# Patient Record
Sex: MALE | Race: WHITE | NOT HISPANIC OR LATINO | URBAN - METROPOLITAN AREA
[De-identification: names, ages, dates, MRNs, and addresses within clinical notes are randomized per-mention and may not be internally consistent; named-entity substitution may affect disease eponyms.]

---

## 2021-07-19 ENCOUNTER — OUTPATIENT (OUTPATIENT)
Dept: OUTPATIENT SERVICES | Facility: HOSPITAL | Age: 31
LOS: 1 days | End: 2021-07-19
Payer: COMMERCIAL

## 2021-07-19 DIAGNOSIS — Z11.52 ENCOUNTER FOR SCREENING FOR COVID-19: ICD-10-CM

## 2021-07-19 LAB — SARS-COV-2 RNA SPEC QL NAA+PROBE: SIGNIFICANT CHANGE UP

## 2021-07-19 PROCEDURE — U0003: CPT

## 2021-07-19 PROCEDURE — U0005: CPT

## 2021-07-19 PROCEDURE — C9803: CPT

## 2022-04-02 ENCOUNTER — EMERGENCY (EMERGENCY)
Facility: HOSPITAL | Age: 32
LOS: 1 days | Discharge: HOME | End: 2022-04-02
Attending: EMERGENCY MEDICINE
Payer: COMMERCIAL

## 2022-04-02 VITALS
RESPIRATION RATE: 20 BRPM | HEIGHT: 71 IN | HEART RATE: 105 BPM | SYSTOLIC BLOOD PRESSURE: 121 MMHG | WEIGHT: 225.09 LBS | DIASTOLIC BLOOD PRESSURE: 76 MMHG | OXYGEN SATURATION: 97 % | TEMPERATURE: 98 F

## 2022-04-02 VITALS
DIASTOLIC BLOOD PRESSURE: 65 MMHG | HEART RATE: 94 BPM | OXYGEN SATURATION: 99 % | RESPIRATION RATE: 18 BRPM | SYSTOLIC BLOOD PRESSURE: 119 MMHG

## 2022-04-02 LAB — T PALLIDUM AB TITR SER: NEGATIVE — SIGNIFICANT CHANGE UP

## 2022-04-02 PROCEDURE — 36415 COLL VENOUS BLD VENIPUNCTURE: CPT

## 2022-04-02 PROCEDURE — 99283 EMERGENCY DEPT VISIT LOW MDM: CPT

## 2022-04-02 PROCEDURE — 86780 TREPONEMA PALLIDUM: CPT

## 2022-04-02 PROCEDURE — 99284 EMERGENCY DEPT VISIT MOD MDM: CPT

## 2022-04-02 RX ORDER — FAMOTIDINE 10 MG/ML
20 INJECTION INTRAVENOUS ONCE
Refills: 0 | Status: COMPLETED | OUTPATIENT
Start: 2022-04-02 | End: 2022-04-02

## 2022-04-02 RX ORDER — FAMOTIDINE 10 MG/ML
1 INJECTION INTRAVENOUS
Qty: 20 | Refills: 0
Start: 2022-04-02 | End: 2022-04-11

## 2022-04-02 RX ADMIN — Medication 50 MILLIGRAM(S): at 11:06

## 2022-04-02 RX ADMIN — FAMOTIDINE 20 MILLIGRAM(S): 10 INJECTION INTRAVENOUS at 11:06

## 2022-04-02 NOTE — ED ADULT NURSE NOTE - DISCHARGE DATE/TIME
Surgery Date: 9/27/19  Location: [facility]  Surgery Type: FR HAMMERTOE CORRECTION FOURTH DIGIT L FOOT  Surgeon: Dr. Slaughter    Fasting CBC BMP UA and EKG ordered per surgeon, any further testing?   02-Apr-2022 11:36

## 2022-04-02 NOTE — ED PROVIDER NOTE - NS ED ATTENDING STATEMENT MOD
This was a shared visit with the FATOUMATA. I reviewed and verified the documentation and independently performed the documented:

## 2022-04-02 NOTE — ED PROVIDER NOTE - CLINICAL SUMMARY MEDICAL DECISION MAKING FREE TEXT BOX
Rash.  initially may have been zoster.  but now with diffuse rash that is not vesicular but rather urticarial.  likely drug rash or allergic in nature.  will discontinue bactrim and start steroids.  follow up with allergy and immunology.

## 2022-04-02 NOTE — ED PROVIDER NOTE - PHYSICAL EXAMINATION
skin: right gluteal cleft vesicular rash and vesicles to penis  skin: macular papular rash urticarial

## 2022-04-02 NOTE — ED ADULT NURSE NOTE - OBJECTIVE STATEMENT
32 year old male presented to ED from home with c/o of rash throughout entire body starting 5 days ago, went to Urgent Care, was told it was a heat rash, prescribed bactrim, rash gradually became worse. pt reports fever/chills 2 days ago, took Motrin at home, no fever since. no pmh, no medications taken everyday. pt denies CP, SOB, nausea/vomiting, numbness/tingling, fever, cough, chills, dizziness, headache, blurred vision, neuro intact. pt a&ox3, lung sounds clear, heart rate regular, abdomen soft nontender nondistended to palp. skin intact except red rash noted throughout body (bilateral arms, legs, neck, face, back, hands, feet). Will continue to monitor and assess while offering support and reassurance.

## 2022-04-02 NOTE — ED PROVIDER NOTE - NSFOLLOWUPCLINICS_GEN_ALL_ED_FT
[facility]  Allergy  [address] [place] [address]  Phone: [phone]  Fax:   Follow Up Time: 4-6 Days    [facility]  Dermatology  [address] [place] [id number]  Phone: [phone]  Fax:   Follow Up Time: 4-6 Days

## 2022-04-02 NOTE — ED ADULT NURSE NOTE - NS_ED_NURSE_TEACHING_TOPIC_ED_A_ED
Addended by: EVAN KNIGHT on: 10/26/2020 08:44 AM     Modules accepted: Orders    
f/u with derm and allergy

## 2022-04-02 NOTE — ED PROVIDER NOTE - PATIENT PORTAL LINK FT
You can access the FollowMyHealth Patient Portal offered by [facility] by registering at the following website: [email or web address]. By joining [employer]’s FollowMyHealth portal, you will also be able to view your health information using other applications (apps) compatible with our system.

## 2022-04-02 NOTE — ED PROVIDER NOTE - NSFOLLOWUPINSTRUCTIONS_ED_ALL_ED_FT
Follow up with your Primary Care Physician within the next 2-3 days  Bring a copy of your test results with you to your appointment  Continue your current medication regimen  Return to the Emergency Room if you experience new or worsening symptoms abdominal pain, nausea, vomiting, fever chills, cough, chest pain, shortness of breath, dizziness, slurred speech, weakness, gait abnormality    DISCONTINUE BACTRIM(ANTIBIOTIC) PREVIOUSLY PRESCRIBED TO YOU  TAKE STEROID PREDNISONE 50MG ONCE DAILY FOR THE NEXT 4 DAYS  TAKE PEPCID TWICE DAILY  TAKE BENADRYL 50MG EVERY 6 HOURS AS NEEDED FOR ITCHY SKIN    [facility]  [address], [address]  [place] [address]  Phone: [phone]    [facility]  [address] [place] [address]  Phone: [phone]

## 2022-04-02 NOTE — ED PROVIDER NOTE - OBJECTIVE STATEMENT
33 y/o male with no PMHx now presenting to the ED with diffuse rash after taking bactrim. Patient reported he was seen by Urgent Care 6 days ago for rash to right gluteal cleft/penis that was painful. Patient reported the rash that looked like pimples with clear drainage and sine then have "scabbed over". Was diagnosed wit "heat rash" and prescribed bactrim (last dose 2 days ago). Reported another rash started to ankles yesterday and then overnight became diffuse involving face, trunk, arms and lower extremities. Current rash is non pruritic. Had fever 100.2 orally last night. Denied CP, SOB, tongue swelling, abdominal pain, N/V/D, urinary complaints, cough, recent illness, new detergent, new soaps, similar event in past, previous drug reactions, h/o STDs, penile discharge 31 y/o male with no PMHx now presenting to the ED with diffuse rash after taking bactrim. Patient reported he was seen by Urgent Care 6 days ago for rash to right gluteal cleft/penis that was painful. Patient reported the rash that looked like pimples with clear drainage and sine then have "scabbed over". Was diagnosed wit "heat rash" and prescribed bactrim (last dose 2 days ago). Reported another rash started to ankles yesterday and then overnight became diffuse involving face, trunk, arms and lower extremities. Current rash is non pruritic. Had fever 100.2 orally last night. Denied CP, SOB, tongue swelling, abdominal pain, N/V/D, urinary complaints, cough, recent illness, new detergent, new soaps, similar event in past, previous drug reactions, h/o STDs, penile discharge    Attendinyo male presents with rash that is diffuse and urticarial in nature.  started 2 days ago.  had low grade fever yesterday.  no vomiting or nausea.

## 2022-04-02 NOTE — ED ADULT NURSE NOTE - CHPI ED NUR SYMPTOMS NEG
no body aches/no chills/no confusion/no decreased eating/drinking/no inflammation/no itching/no pain/no scaly patches on skin/no vomiting

## 2024-08-14 NOTE — ED ADULT NURSE NOTE - PAIN RATING/NUMBER SCALE (0-10): ACTIVITY
Problem: Adult Inpatient Plan of Care  Goal: Plan of Care Review  8/14/2024 1655 by Rogelio Blanc RN  Flowsheets (Taken 8/14/2024 1655)  Progress: no change  Plan of Care Reviewed With: patient  Outcome Evaluation: PT CAME FROM ED AROUND LUNCH. SINCE ARRIVAL VSS. WAITTING FOR MRI. NO NEW CONCERNS AT THIS TIME.  8/14/2024 1655 by Rogelio Blanc RN  Outcome: Ongoing, Progressing  Flowsheets (Taken 8/14/2024 1655)  Progress: no change  Plan of Care Reviewed With: patient  Outcome Evaluation: PT CAME FROM ED AROUND LUNCH. SINCE ARRIVAL VSS. WAITTING FOR MRI. NO NEW CONCERNS AT THIS TIME.  Goal: Patient-Specific Goal (Individualized)  Outcome: Ongoing, Progressing  Goal: Absence of Hospital-Acquired Illness or Injury  Outcome: Ongoing, Progressing  Intervention: Identify and Manage Fall Risk  Recent Flowsheet Documentation  Taken 8/14/2024 1340 by Rogelio Blanc RN  Safety Promotion/Fall Prevention: safety round/check completed  Goal: Optimal Comfort and Wellbeing  Outcome: Ongoing, Progressing  Goal: Readiness for Transition of Care  Outcome: Ongoing, Progressing  Intervention: Mutually Develop Transition Plan  Recent Flowsheet Documentation  Taken 8/14/2024 1448 by Rogelio Blanc RN  Transportation Anticipated: family or friend will provide  Transportation Concerns: none  Patient/Family Anticipated Services at Transition: home health care  Patient/Family Anticipates Transition to:   home   home with help/services  Taken 8/14/2024 1431 by Rogelio Blanc RN  Equipment Currently Used at Home: walker, rolling     Problem: Skin Injury Risk Increased  Goal: Skin Health and Integrity  Outcome: Ongoing, Progressing     Problem: Hypertension Comorbidity  Goal: Blood Pressure in Desired Range  Outcome: Ongoing, Progressing   Goal Outcome Evaluation:  Plan of Care Reviewed With: patient        Progress: no change  Outcome Evaluation: PT CAME FROM ED AROUND LUNCH. SINCE ARRIVAL VSS. WAITTING FOR MRI. NO NEW CONCERNS AT THIS  TIME.                                0